# Patient Record
Sex: MALE | Race: WHITE | URBAN - METROPOLITAN AREA
[De-identification: names, ages, dates, MRNs, and addresses within clinical notes are randomized per-mention and may not be internally consistent; named-entity substitution may affect disease eponyms.]

---

## 2018-10-16 ENCOUNTER — HOSPITAL ENCOUNTER (OUTPATIENT)
Dept: WOUND CARE | Age: 48
Discharge: HOME | End: 2018-10-16
Payer: COMMERCIAL

## 2018-10-16 VITALS
HEART RATE: 67 BPM | DIASTOLIC BLOOD PRESSURE: 74 MMHG | BODY MASS INDEX: 19.88 KG/M2 | RESPIRATION RATE: 18 BRPM | HEIGHT: 73 IN | TEMPERATURE: 98.1 F | WEIGHT: 150 LBS | SYSTOLIC BLOOD PRESSURE: 111 MMHG

## 2018-10-16 DIAGNOSIS — L98.492 SKIN ULCER OF PERIRECTAL REGION, WITH FAT LAYER EXPOSED (HCC): ICD-10-CM

## 2018-10-16 DIAGNOSIS — T81.31XD SUPERFICIAL DISRUPTION OR DEHISCENCE OF OPERATION WOUND, SUBSEQUENT ENCOUNTER: ICD-10-CM

## 2018-10-16 PROBLEM — T81.31XA SUPERFICIAL DISRUPTION OR DEHISCENCE OF OPERATION WOUND: Status: ACTIVE | Noted: 2018-10-16

## 2018-10-16 PROCEDURE — 87071 CULTURE AEROBIC QUANT OTHER: CPT

## 2018-10-16 PROCEDURE — 87077 CULTURE AEROBIC IDENTIFY: CPT

## 2018-10-16 PROCEDURE — 87073 CULTURE BACTERIA ANAEROBIC: CPT

## 2018-10-16 PROCEDURE — 87186 SC STD MICRODIL/AGAR DIL: CPT

## 2018-10-16 PROCEDURE — 11042 DBRDMT SUBQ TIS 1ST 20SQCM/<: CPT

## 2018-10-16 PROCEDURE — 99213 OFFICE O/P EST LOW 20 MIN: CPT

## 2018-10-16 RX ORDER — ONDANSETRON HYDROCHLORIDE 8 MG/1
8 TABLET, FILM COATED ORAL EVERY 8 HOURS PRN
COMMUNITY

## 2018-10-16 RX ORDER — CHLORHEXIDINE GLUCONATE 4 G/100ML
SOLUTION TOPICAL DAILY PRN
COMMUNITY

## 2018-10-16 RX ORDER — CALCIUM CARBONATE 500(1250)
500 TABLET ORAL DAILY
COMMUNITY

## 2018-10-16 RX ORDER — NICOTINE 21 MG/24HR
1 PATCH, TRANSDERMAL 24 HOURS TRANSDERMAL EVERY 24 HOURS
COMMUNITY

## 2018-10-16 RX ORDER — FLUTICASONE PROPIONATE 50 MCG
1 SPRAY, SUSPENSION (ML) NASAL DAILY
COMMUNITY

## 2018-10-16 RX ORDER — OXYCODONE HYDROCHLORIDE 10 MG/1
10 TABLET ORAL NIGHTLY PRN
COMMUNITY

## 2018-10-16 RX ORDER — MORPHINE SULFATE 15 MG/1
15 TABLET ORAL 2 TIMES DAILY PRN
COMMUNITY

## 2018-10-16 RX ORDER — VORICONAZOLE 200 MG/1
200 TABLET, FILM COATED ORAL 2 TIMES DAILY
COMMUNITY

## 2018-10-16 RX ORDER — LIDOCAINE HYDROCHLORIDE 40 MG/ML
SOLUTION TOPICAL ONCE
Status: DISCONTINUED | OUTPATIENT
Start: 2018-10-16 | End: 2018-10-17 | Stop reason: HOSPADM

## 2018-10-16 RX ORDER — MUPIROCIN CALCIUM 20 MG/G
CREAM TOPICAL DAILY
COMMUNITY

## 2018-10-16 RX ORDER — FOLIC ACID 1 MG/1
1 TABLET ORAL DAILY
COMMUNITY

## 2018-10-16 RX ORDER — OXYCODONE HYDROCHLORIDE 5 MG/1
5 TABLET ORAL EVERY 4 HOURS PRN
COMMUNITY

## 2018-10-16 RX ORDER — CITALOPRAM 20 MG/1
20 TABLET ORAL DAILY
COMMUNITY

## 2018-10-16 RX ORDER — SENNA PLUS 8.6 MG/1
1 TABLET ORAL DAILY
COMMUNITY

## 2018-10-16 ASSESSMENT — PAIN SCALES - GENERAL: PAINLEVEL_OUTOF10: 5

## 2018-10-16 ASSESSMENT — PAIN DESCRIPTION - DESCRIPTORS: DESCRIPTORS: SORE

## 2018-10-16 ASSESSMENT — PAIN DESCRIPTION - ONSET: ONSET: ON-GOING

## 2018-10-16 ASSESSMENT — PAIN DESCRIPTION - ORIENTATION: ORIENTATION: RIGHT

## 2018-10-16 ASSESSMENT — PAIN DESCRIPTION - FREQUENCY: FREQUENCY: CONTINUOUS

## 2018-10-16 ASSESSMENT — PAIN DESCRIPTION - PAIN TYPE: TYPE: CHRONIC PAIN

## 2018-10-16 ASSESSMENT — PAIN DESCRIPTION - LOCATION: LOCATION: CHEST

## 2018-10-20 LAB
CULTURE: NORMAL
Lab: NORMAL
ORGANISM: NORMAL
REPORT STATUS: NORMAL
SPECIMEN: NORMAL

## 2018-10-30 ENCOUNTER — HOSPITAL ENCOUNTER (OUTPATIENT)
Dept: WOUND CARE | Age: 48
Discharge: HOME | End: 2018-10-30
Payer: COMMERCIAL

## 2018-10-30 VITALS
RESPIRATION RATE: 18 BRPM | DIASTOLIC BLOOD PRESSURE: 77 MMHG | HEART RATE: 68 BPM | TEMPERATURE: 98.7 F | SYSTOLIC BLOOD PRESSURE: 128 MMHG

## 2018-10-30 DIAGNOSIS — L98.492 SKIN ULCER OF PERIRECTAL REGION, WITH FAT LAYER EXPOSED (HCC): ICD-10-CM

## 2018-10-30 DIAGNOSIS — T81.31XD SUPERFICIAL DISRUPTION OR DEHISCENCE OF OPERATION WOUND, SUBSEQUENT ENCOUNTER: Primary | ICD-10-CM

## 2018-10-30 PROCEDURE — 17250 CHEM CAUT OF GRANLTJ TISSUE: CPT

## 2018-10-30 RX ORDER — LIDOCAINE HYDROCHLORIDE 40 MG/ML
SOLUTION TOPICAL ONCE
Status: DISCONTINUED | OUTPATIENT
Start: 2018-10-30 | End: 2018-10-31 | Stop reason: HOSPADM

## 2018-10-30 ASSESSMENT — PAIN DESCRIPTION - PAIN TYPE: TYPE: CHRONIC PAIN

## 2018-10-30 ASSESSMENT — PAIN DESCRIPTION - ORIENTATION: ORIENTATION: RIGHT;MID

## 2018-10-30 ASSESSMENT — PAIN DESCRIPTION - ONSET: ONSET: ON-GOING

## 2018-10-30 ASSESSMENT — PAIN DESCRIPTION - DESCRIPTORS: DESCRIPTORS: SORE

## 2018-10-30 ASSESSMENT — PAIN SCALES - GENERAL: PAINLEVEL_OUTOF10: 5

## 2018-10-30 ASSESSMENT — PAIN DESCRIPTION - LOCATION: LOCATION: FACE;CHEST

## 2018-10-30 ASSESSMENT — PAIN DESCRIPTION - FREQUENCY: FREQUENCY: CONTINUOUS

## 2018-10-30 NOTE — PROGRESS NOTES
daily as needed for Pain. Jonathan Chamorro nicotine (NICODERM CQ) 14 MG/24HR Place 1 patch onto the skin every 24 hours      nystatin (MYCOSTATIN) 474413 UNIT/ML suspension Take 500,000 Units by mouth 4 times daily      ondansetron (ZOFRAN) 8 MG tablet 8 mg by Per G Tube route every 8 hours as needed for Nausea or Vomiting      oxyCODONE HCl (OXY-IR) 10 MG immediate release tablet Take 10 mg by mouth nightly as needed for Pain. 2 TABLETS .  oxyCODONE (ROXICODONE) 5 MG immediate release tablet 5 mg by Per G Tube route every 4 hours as needed for Pain. [facility]  senna (SENOKOT) 8.6 MG tablet 1 tablet by Per G Tube route daily      tiotropium (SPIRIVA) 18 MCG inhalation capsule Inhale 18 mcg into the lungs daily      Oral Hygiene Products (TOOTHETTE PLUS UNTREATED) SWAB Take by mouth      voriconazole (VFEND) 200 MG tablet 200 mg by PEG Tube route 2 times daily      chlorhexidine (HIBICLENS) 4 % external liquid Apply topically daily as needed Apply topically daily as needed. No current facility-administered medications on file prior to encounter. REVIEW OF SYSTEMS  Constitutional: Negative for systemic symptoms including fever, chills and malaise. A comprehensive review of systems was negative.       Objective:      /77   Pulse 68   Temp 98.7 °F (37.1 °C) (Temporal)   Resp 18     PHYSICAL EXAM  General Appearance: alert and oriented to person, place and time, well developed and well- nourished, in no acute distress  Skin: warm and dry, no rash or erythema  Cardiovascular: normal rate, regular rhythm, normal S1 and S2, no murmurs, rubs, clicks, or gallops, distal pulses intact, no carotid bruits  Abdomen: soft, non-tender, non-distended, normal bowel sounds, no masses or organomegaly  Extremities: no cyanosis, clubbing or edema  Musculoskeletal: normal range of motion, no joint swelling, deformity or tenderness  Neurologic: reflexes normal and symmetric, no cranial nerve deficit, gait, 10/30/2018  9:18 AM   Drainage Amount Large 10/30/2018  9:18 AM   Drainage Description Brown;Green;Yellow 10/30/2018  9:18 AM   Odor None 10/30/2018  9:18 AM   Margins Defined edges 10/30/2018  9:18 AM   Padma-wound Assessment Dry 10/30/2018  9:18 AM   Non-staged Wound Description Full thickness 10/30/2018  9:18 AM   [place]%Wound Bed 0 10/30/2018  9:18 AM   Red%Wound Bed 100 10/30/2018  9:18 AM   Yellow%Wound Bed 0 10/30/2018  9:18 AM   Black%Wound Bed 0 10/30/2018  9:18 AM   Purple%Wound Bed 0 10/30/2018  9:18 AM   Other%Wound Bed 0 10/30/2018  9:18 AM   Number of days: 14       Percent of Wound(s) Debrided: approximately 100%    Total Surface Area Debrided:  1.12 sq cm     Bleeding:  Minimal    Hemostasis Achieved:  by silver nitrate stick    Procedural Pain:  3  / 10     Post Procedural Pain:  5 / 10     Response to treatment:  Poorly tolerated by patient. Status of wound progress and description from last visit:   Same .       Plan:     Discharge instructions:       Treatment Note Wound 10/16/18 RIGHT UPPER CHEST DISTAL WOUND  SURGICAL ( WOUND # 2 ONSET DATE 1 YEAR 9/17) POST SURICAL-Dressing/Treatment:  (silver nitrate 4x4, abd, hyperfix tape)  Wound 10/16/18 RIGHT CHEST UPPER PROXIMAL WOUND  SURGICAL ( Suann Benton # 1 ONSET DATE 9/17) POST SURICAL-Dressing/Treatment:  (silver nitrate 4x4, abd, hyperfix tape)    Written Patient Dismissal Instructions Given            Electronically signed by Evans Chawla MD on 10/30/2018 at 12:18 PM

## 2018-11-06 ENCOUNTER — HOSPITAL ENCOUNTER (OUTPATIENT)
Dept: WOUND CARE | Age: 48
Discharge: HOME | End: 2018-11-06

## 2018-11-06 VITALS — TEMPERATURE: 98.8 F | HEART RATE: 70 BPM | DIASTOLIC BLOOD PRESSURE: 73 MMHG | SYSTOLIC BLOOD PRESSURE: 119 MMHG

## 2018-11-06 DIAGNOSIS — T81.31XA SUPERFICIAL DISRUPTION OR DEHISCENCE OF OPERATION WOUND, INITIAL ENCOUNTER: Primary | ICD-10-CM

## 2018-11-06 DIAGNOSIS — L98.492 SKIN ULCER OF PERIRECTAL REGION, WITH FAT LAYER EXPOSED (HCC): ICD-10-CM

## 2018-11-06 PROCEDURE — 11100 HC BIOPSY SKIN LESION SINGLE: CPT

## 2018-11-06 PROCEDURE — 88305 TISSUE EXAM BY PATHOLOGIST: CPT

## 2018-11-06 RX ORDER — LIDOCAINE HYDROCHLORIDE 40 MG/ML
SOLUTION TOPICAL ONCE
Status: DISCONTINUED | OUTPATIENT
Start: 2018-11-06 | End: 2018-11-07 | Stop reason: HOSPADM

## 2018-11-06 ASSESSMENT — PAIN SCALES - GENERAL: PAINLEVEL_OUTOF10: 5

## 2018-11-06 ASSESSMENT — PAIN DESCRIPTION - DESCRIPTORS: DESCRIPTORS: SORE

## 2018-11-06 ASSESSMENT — PAIN DESCRIPTION - FREQUENCY: FREQUENCY: CONTINUOUS

## 2018-11-06 ASSESSMENT — PAIN DESCRIPTION - ONSET: ONSET: ON-GOING

## 2018-11-06 ASSESSMENT — PAIN DESCRIPTION - LOCATION: LOCATION: CHEST

## 2018-11-06 ASSESSMENT — PAIN DESCRIPTION - PAIN TYPE: TYPE: CHRONIC PAIN

## 2018-11-06 ASSESSMENT — PAIN DESCRIPTION - PROGRESSION: CLINICAL_PROGRESSION: NOT CHANGED

## 2018-11-13 ENCOUNTER — HOSPITAL ENCOUNTER (OUTPATIENT)
Dept: WOUND CARE | Age: 48
Discharge: HOME | End: 2018-11-13

## 2018-11-13 VITALS
SYSTOLIC BLOOD PRESSURE: 129 MMHG | TEMPERATURE: 99.2 F | RESPIRATION RATE: 18 BRPM | DIASTOLIC BLOOD PRESSURE: 80 MMHG | HEART RATE: 74 BPM

## 2018-11-13 DIAGNOSIS — T81.31XD SUPERFICIAL DISRUPTION OR DEHISCENCE OF OPERATION WOUND, SUBSEQUENT ENCOUNTER: Primary | ICD-10-CM

## 2018-11-13 PROBLEM — L98.492: Status: RESOLVED | Noted: 2018-10-16 | Resolved: 2018-11-13

## 2018-11-13 PROCEDURE — 11042 DBRDMT SUBQ TIS 1ST 20SQCM/<: CPT

## 2018-11-13 RX ORDER — LIDOCAINE HYDROCHLORIDE 40 MG/ML
SOLUTION TOPICAL ONCE
Status: DISCONTINUED | OUTPATIENT
Start: 2018-11-13 | End: 2018-11-14 | Stop reason: HOSPADM

## 2018-11-13 ASSESSMENT — PAIN DESCRIPTION - ONSET: ONSET: ON-GOING

## 2018-11-13 ASSESSMENT — PAIN SCALES - GENERAL: PAINLEVEL_OUTOF10: 5

## 2018-11-13 ASSESSMENT — PAIN DESCRIPTION - ORIENTATION: ORIENTATION: RIGHT;UPPER

## 2018-11-13 ASSESSMENT — PAIN DESCRIPTION - LOCATION: LOCATION: CHEST

## 2018-11-13 ASSESSMENT — PAIN DESCRIPTION - DESCRIPTORS: DESCRIPTORS: SORE

## 2018-11-13 ASSESSMENT — PAIN DESCRIPTION - PROGRESSION: CLINICAL_PROGRESSION: NOT CHANGED

## 2018-11-13 ASSESSMENT — PAIN DESCRIPTION - PAIN TYPE: TYPE: CHRONIC PAIN

## 2018-11-13 ASSESSMENT — PAIN DESCRIPTION - FREQUENCY: FREQUENCY: CONTINUOUS

## 2018-11-13 NOTE — PROGRESS NOTES
route daily      chlorhexidine (HIBICLENS) 4 % external liquid Apply topically daily as needed Apply topically daily as needed.  morphine (MSIR) 15 MG tablet Take 15 mg by mouth 2 times daily as needed for Pain. [facility] nicotine (NICODERM CQ) 14 MG/24HR Place 1 patch onto the skin every 24 hours      nystatin (MYCOSTATIN) 608144 UNIT/ML suspension Take 500,000 Units by mouth 4 times daily      ondansetron (ZOFRAN) 8 MG tablet 8 mg by Per G Tube route every 8 hours as needed for Nausea or Vomiting      oxyCODONE (ROXICODONE) 5 MG immediate release tablet 5 mg by Per G Tube route every 4 hours as needed for Pain. Garlon Odor  senna (SENOKOT) 8.6 MG tablet 1 tablet by Per G Tube route daily      tiotropium (SPIRIVA) 18 MCG inhalation capsule Inhale 18 mcg into the lungs daily      Oral Hygiene Products (TOOTHETTE PLUS UNTREATED) SWAB Take by mouth      voriconazole (VFEND) 200 MG tablet 200 mg by PEG Tube route 2 times daily      oxyCODONE HCl (OXY-IR) 10 MG immediate release tablet Take 10 mg by mouth nightly as needed for Pain. 2 TABLETS . No current facility-administered medications on file prior to encounter. REVIEW OF SYSTEMS  Constitutional: Negative for systemic symptoms including fever, chills and malaise. A comprehensive review of systems was negative.       Objective:      /80   Pulse 74   Temp 99.2 °F (37.3 °C) (Temporal)   Resp 18     PHYSICAL EXAM  General Appearance: alert and oriented to person, place and time, well developed and well- nourished, in no acute distress  Pulmonary/Chest: clear to auscultation bilaterally- no wheezes, rales or rhonchi, normal air movement, no respiratory distress  Cardiovascular: normal rate, regular rhythm, normal S1 and S2, no murmurs, rubs, clicks, or gallops, distal pulses intact, no carotid bruits  Abdomen: soft, non-tender, non-distended, normal bowel sounds, no masses or organomegaly  Extremities: no cyanosis, clubbing or

## 2018-11-27 ENCOUNTER — HOSPITAL ENCOUNTER (OUTPATIENT)
Dept: WOUND CARE | Age: 48
Discharge: HOME | End: 2018-11-27

## 2018-11-27 VITALS
HEART RATE: 64 BPM | TEMPERATURE: 98.8 F | RESPIRATION RATE: 18 BRPM | SYSTOLIC BLOOD PRESSURE: 111 MMHG | DIASTOLIC BLOOD PRESSURE: 63 MMHG

## 2018-11-27 DIAGNOSIS — T81.31XD SUPERFICIAL DISRUPTION OR DEHISCENCE OF OPERATION WOUND, SUBSEQUENT ENCOUNTER: Primary | ICD-10-CM

## 2018-11-27 PROCEDURE — 17250 CHEM CAUT OF GRANLTJ TISSUE: CPT

## 2018-11-27 RX ORDER — LIDOCAINE HYDROCHLORIDE 40 MG/ML
SOLUTION TOPICAL ONCE
Status: DISCONTINUED | OUTPATIENT
Start: 2018-11-27 | End: 2018-11-28 | Stop reason: HOSPADM

## 2018-11-27 ASSESSMENT — PAIN DESCRIPTION - ORIENTATION: ORIENTATION: RIGHT

## 2018-11-27 ASSESSMENT — PAIN DESCRIPTION - PAIN TYPE: TYPE: CHRONIC PAIN

## 2018-11-27 ASSESSMENT — PAIN DESCRIPTION - LOCATION: LOCATION: CHEST

## 2018-11-27 ASSESSMENT — PAIN DESCRIPTION - ONSET: ONSET: ON-GOING

## 2018-11-27 ASSESSMENT — PAIN DESCRIPTION - DESCRIPTORS: DESCRIPTORS: SORE

## 2018-11-27 ASSESSMENT — PAIN DESCRIPTION - PROGRESSION: CLINICAL_PROGRESSION: NOT CHANGED

## 2018-11-27 ASSESSMENT — PAIN DESCRIPTION - FREQUENCY: FREQUENCY: CONTINUOUS

## 2018-12-11 ENCOUNTER — HOSPITAL ENCOUNTER (OUTPATIENT)
Dept: WOUND CARE | Age: 48
Discharge: HOME | End: 2018-12-11

## 2018-12-11 VITALS
DIASTOLIC BLOOD PRESSURE: 75 MMHG | RESPIRATION RATE: 16 BRPM | SYSTOLIC BLOOD PRESSURE: 125 MMHG | HEART RATE: 77 BPM | TEMPERATURE: 99 F

## 2018-12-11 DIAGNOSIS — T81.31XD SUPERFICIAL DISRUPTION OR DEHISCENCE OF OPERATION WOUND, SUBSEQUENT ENCOUNTER: Primary | ICD-10-CM

## 2018-12-11 PROCEDURE — 17250 CHEM CAUT OF GRANLTJ TISSUE: CPT

## 2018-12-11 ASSESSMENT — PAIN DESCRIPTION - ONSET: ONSET: ON-GOING

## 2018-12-11 ASSESSMENT — PAIN DESCRIPTION - LOCATION: LOCATION: CHEST

## 2018-12-11 ASSESSMENT — PAIN DESCRIPTION - FREQUENCY: FREQUENCY: CONTINUOUS

## 2018-12-11 ASSESSMENT — PAIN DESCRIPTION - PROGRESSION: CLINICAL_PROGRESSION: NOT CHANGED

## 2018-12-11 ASSESSMENT — PAIN DESCRIPTION - PAIN TYPE: TYPE: CHRONIC PAIN

## 2018-12-11 ASSESSMENT — PAIN SCALES - GENERAL: PAINLEVEL_OUTOF10: 7

## 2018-12-11 ASSESSMENT — PAIN DESCRIPTION - ORIENTATION: ORIENTATION: RIGHT

## 2018-12-11 ASSESSMENT — PAIN DESCRIPTION - DESCRIPTORS: DESCRIPTORS: ACHING;SORE

## 2018-12-11 NOTE — PROGRESS NOTES
Tube route daily      tiotropium (SPIRIVA) 18 MCG inhalation capsule Inhale 18 mcg into the lungs daily      voriconazole (VFEND) 200 MG tablet 200 mg by PEG Tube route 2 times daily      mupirocin (BACTROBAN) 2 % cream Apply topically daily      Artificial Saliva (BIOTENE MOISTURIZING MOUTH) SOLN Take by mouth as needed      chlorhexidine (HIBICLENS) 4 % external liquid Apply topically daily as needed Apply topically daily as needed.  morphine (MSIR) 15 MG tablet Take 15 mg by mouth 2 times daily as needed for Pain. .      ondansetron (ZOFRAN) 8 MG tablet 8 mg by Per G Tube route every 8 hours as needed for Nausea or Vomiting      oxyCODONE HCl (OXY-IR) 10 MG immediate release tablet Take 10 mg by mouth nightly as needed for Pain. 2 TABLETS .  oxyCODONE (ROXICODONE) 5 MG immediate release tablet 5 mg by Per G Tube route every 4 hours as needed for Pain. Odell Romero Oral Hygiene Products (TOOTHETTE PLUS UNTREATED) SWAB Take by mouth       No current facility-administered medications on file prior to encounter. REVIEW OF SYSTEMS  Constitutional: Negative for systemic symptoms including fever, chills and malaise. A comprehensive review of systems was negative. Objective:      /75   Pulse 77   Temp 99 °F (37.2 °C) (Temporal)   Resp 16     PHYSICAL EXAM  General Appearance: alert and oriented to person, place and time, well developed and well- nourished, in no acute distress  Abdomen: soft, non-tender, non-distended, normal bowel sounds, no masses or organomegaly  Extremities: no cyanosis, clubbing or edema  Musculoskeletal: normal range of motion, no joint swelling, deformity or tenderness  Neurologic: reflexes normal and symmetric, no cranial nerve deficit, gait, coordination and speech normal    General: The patient is in no acute distress. Mental status:  Patient is appropriate, is  oriented to place and plan of care.   Dermatologic exam: Visual inspection of the periwound 12/11/2018  9:41 AM   Dressing Change Due 02/05/18 12/11/2018  9:41 AM   Wound Length (cm) 2.5 cm 12/11/2018  9:41 AM   Wound Width (cm) 3 cm 12/11/2018  9:41 AM   Wound Depth (cm) 0.1 cm 12/11/2018  9:41 AM   Wound Surface Area (cm^2) 7.5 cm^2 12/11/2018  9:41 AM   Wound Volume (cm^3) 0.75 cm^3 12/11/2018  9:41 AM   Distance Tunneling (cm) 0 cm 12/11/2018  9:41 AM   Tunneling Position ___ O'Clock 0 12/11/2018  9:41 AM   Undermining Starts ___ O'Clock 0 12/11/2018  9:41 AM   Undermining Ends___ O'Clock 0 12/11/2018  9:41 AM   Undermining Maxium Distance (cm) 0 12/11/2018  9:41 AM   Wound Assessment Yellow 12/11/2018  9:41 AM   Drainage Amount None 12/11/2018  9:41 AM   Drainage Description Yellow;GINI 12/11/2018  9:41 AM   Odor None 12/11/2018  9:41 AM   Margins Undefined edges 12/11/2018  9:41 AM   Padma-wound Assessment Pink 12/11/2018  9:41 AM   Non-staged Wound Description Full thickness 12/11/2018  9:41 AM   [place]%Wound Bed 0 12/11/2018  9:41 AM   Red%Wound Bed 0 12/11/2018  9:41 AM   Yellow%Wound Bed 100 12/11/2018  9:41 AM   Black%Wound Bed 0 12/11/2018  9:41 AM   Purple%Wound Bed 0 12/11/2018  9:41 AM   Other%Wound Bed 0 12/11/2018  9:41 AM   Number of days: 14       Percent of Wound(s) Debrided: approximately 100%    Total Surface Area Debrided:  7.7 sq cm     Bleeding:  Minimal    Hemostasis Achieved:  by silver nitrate stick    Procedural Pain:  5  / 10     Post Procedural Pain:  6 / 10     Response to treatment:  Poorly tolerated by patient. Status of wound progress and description from last visit:   same.       Plan:     Discharge instructions:       Treatment Note      Written Patient Dismissal Instructions Given            Electronically signed by Makenna Norman MD on 12/11/2018 at 12:40 PM

## 2018-12-18 ENCOUNTER — HOSPITAL ENCOUNTER (OUTPATIENT)
Dept: WOUND CARE | Age: 48
Discharge: HOME | End: 2018-12-18